# Patient Record
Sex: FEMALE | Race: WHITE | Employment: FULL TIME | URBAN - NONMETROPOLITAN AREA
[De-identification: names, ages, dates, MRNs, and addresses within clinical notes are randomized per-mention and may not be internally consistent; named-entity substitution may affect disease eponyms.]

---

## 2019-07-12 ENCOUNTER — HOSPITAL ENCOUNTER (EMERGENCY)
Facility: HOSPITAL | Age: 49
Discharge: HOME | End: 2019-07-12
Attending: FAMILY MEDICINE | Admitting: FAMILY MEDICINE
Payer: COMMERCIAL

## 2019-07-12 VITALS
SYSTOLIC BLOOD PRESSURE: 131 MMHG | TEMPERATURE: 98.1 F | OXYGEN SATURATION: 93 % | HEART RATE: 89 BPM | DIASTOLIC BLOOD PRESSURE: 83 MMHG | RESPIRATION RATE: 16 BRPM

## 2019-07-12 DIAGNOSIS — M54.42 ACUTE BILATERAL LOW BACK PAIN WITH LEFT-SIDED SCIATICA: ICD-10-CM

## 2019-07-12 PROCEDURE — 96374 THER/PROPH/DIAG INJ IV PUSH: CPT

## 2019-07-12 PROCEDURE — 99284 EMERGENCY DEPT VISIT MOD MDM: CPT | Mod: 25

## 2019-07-12 PROCEDURE — 25000132 ZZH RX MED GY IP 250 OP 250 PS 637: Performed by: FAMILY MEDICINE

## 2019-07-12 PROCEDURE — 25000128 H RX IP 250 OP 636: Performed by: FAMILY MEDICINE

## 2019-07-12 PROCEDURE — 99284 EMERGENCY DEPT VISIT MOD MDM: CPT | Mod: Z6 | Performed by: FAMILY MEDICINE

## 2019-07-12 RX ORDER — FAMOTIDINE 20 MG
TABLET ORAL
COMMUNITY

## 2019-07-12 RX ORDER — CHLORAL HYDRATE 500 MG
2 CAPSULE ORAL DAILY
COMMUNITY

## 2019-07-12 RX ORDER — PREDNISONE 20 MG/1
TABLET ORAL
Qty: 6 TABLET | Refills: 0 | Status: SHIPPED | OUTPATIENT
Start: 2019-07-12

## 2019-07-12 RX ORDER — METHYLPREDNISOLONE SODIUM SUCCINATE 125 MG/2ML
125 INJECTION, POWDER, LYOPHILIZED, FOR SOLUTION INTRAMUSCULAR; INTRAVENOUS ONCE
Status: COMPLETED | OUTPATIENT
Start: 2019-07-12 | End: 2019-07-12

## 2019-07-12 RX ORDER — MULTIPLE VITAMINS W/ MINERALS TAB 9MG-400MCG
1 TAB ORAL DAILY
COMMUNITY

## 2019-07-12 RX ORDER — GABAPENTIN 300 MG/1
900 CAPSULE ORAL ONCE
Status: COMPLETED | OUTPATIENT
Start: 2019-07-12 | End: 2019-07-12

## 2019-07-12 RX ORDER — BUPROPION HYDROCHLORIDE 300 MG/1
300 TABLET ORAL EVERY MORNING
COMMUNITY

## 2019-07-12 RX ORDER — GABAPENTIN 300 MG/1
600 CAPSULE ORAL 3 TIMES DAILY
Qty: 90 CAPSULE | Refills: 0 | Status: SHIPPED | OUTPATIENT
Start: 2019-07-12

## 2019-07-12 RX ADMIN — METHYLPREDNISOLONE SODIUM SUCCINATE 125 MG: 125 INJECTION, POWDER, FOR SOLUTION INTRAMUSCULAR; INTRAVENOUS at 07:02

## 2019-07-12 RX ADMIN — GABAPENTIN 900 MG: 300 CAPSULE ORAL at 07:02

## 2019-07-12 ASSESSMENT — ENCOUNTER SYMPTOMS
BACK PAIN: 1
ACTIVITY CHANGE: 1
NECK PAIN: 0
CONSTIPATION: 0
PSYCHIATRIC NEGATIVE: 1
SHORTNESS OF BREATH: 0
VOMITING: 0
DIARRHEA: 0
DIAPHORESIS: 0
NAUSEA: 0
PALPITATIONS: 0
NUMBNESS: 0
FATIGUE: 1
WEAKNESS: 0

## 2019-07-12 NOTE — ED AVS SNAPSHOT
[facility]  [address] [address]  Phone:  [phone]                                    Sophy Cruz   MRN: [id number]    Department:  [facility]   Date of Visit:  7/12/2019           After Visit Summary Signature Page    I have received my discharge instructions, and my questions have been answered. I have discussed any challenges I see with this plan with the nurse or doctor.    ..........................................................................................................................................  Patient/Patient Representative Signature      ..........................................................................................................................................  Patient Representative Print Name and Relationship to Patient    ..................................................               ................................................  Date                                   Time    ..........................................................................................................................................  Reviewed by Signature/Title    ...................................................              ..............................................  Date                                               Time          22EPIC Rev 08/18

## 2019-07-12 NOTE — ED NOTES
Face to face report given with opportunity to observe patient.    Report given to marcia Dai   7/12/2019  7:19 AM

## 2019-07-12 NOTE — ED PROVIDER NOTES
History     Chief Complaint   Patient presents with     Leg Pain     last night thougt she had flair up of disc pain, has disc problems around L4-L5, woke up this morning with left lwoer back pain that travels down left leg, and minimal pain on right side. took pt one hour to get out of bed     HPI  Sophy Cruz is a 48 year old female who last night had some increase in her L4-5 disc pain, but woke this morning with pain travels down her left leg to the knee.  It radiates around the lateral aspect and into the anterior.  She also has minimal pain on the right side.  She took 1200 mg of ibuprofen at about 0300, states it did not help.  This morning it took her 1 hour to get out of bed, however she was able to shower and ambulate into the emergency room with minimal difficulty.  The pain is 10+/10.  Currently she states that the pain is present, worse with sitting and standing.  She has previously had an epidural injection in her lumbar spine for her back pain.    Allergies:  Allergies not on file    Problem List:    There are no active problems to display for this patient.       Past Medical History:    No past medical history on file.    Past Surgical History:    No past surgical history on file.    Family History:    No family history on file.    Social History:  Marital Status:    Social History     Tobacco Use     Smoking status: Current Every Day Smoker     Packs/day: 1.00     Smokeless tobacco: Never Used   Substance Use Topics     Alcohol use: Yes     Comment: rare     Drug use: Never        Medications:      buPROPion (WELLBUTRIN XL) 300 MG 24 hr tablet   fish oil-omega-3 fatty acids 1000 MG capsule   gabapentin (NEURONTIN) 300 MG capsule   multivitamin w/minerals (MULTI-VITAMIN) tablet   predniSONE (DELTASONE) 20 MG tablet   Vitamin D, Cholecalciferol, 1000 units CAPS         Review of Systems   Constitutional: Positive for activity change and fatigue. Negative for diaphoresis.        Slept poorly  last night   HENT: Negative.    Respiratory: Negative for shortness of breath.    Cardiovascular: Negative for chest pain and palpitations.   Gastrointestinal: Negative for constipation, diarrhea, nausea and vomiting.   Genitourinary: Negative.    Musculoskeletal: Positive for back pain. Negative for neck pain.        See HPI   Neurological: Negative for weakness and numbness.   Psychiatric/Behavioral: Negative.        Physical Exam   BP: 135/98  Pulse: 89  Temp: 97.7  F (36.5  C)  Resp: 18  SpO2: 99 %      Physical Exam   Constitutional: She is oriented to person, place, and time. She appears well-developed and well-nourished. She appears distressed.   Stated, not observed   HENT:   Head: Normocephalic and atraumatic.   Neck: Normal range of motion. Neck supple.   Cardiovascular: Normal rate, regular rhythm and normal heart sounds.   No murmur heard.  Pulmonary/Chest: Effort normal and breath sounds normal. No respiratory distress.   Abdominal: Soft. Bowel sounds are normal. She exhibits no distension. There is no tenderness.   Musculoskeletal: She exhibits tenderness.        Lumbar back: She exhibits decreased range of motion and pain. She exhibits no tenderness, no bony tenderness and no spasm.   Neurological: She is alert and oriented to person, place, and time. She displays normal reflexes. No sensory deficit.   Skin: Skin is warm and dry. Capillary refill takes less than 2 seconds.   Psychiatric: She has a normal mood and affect.   Nursing note and vitals reviewed.      ED Course        Procedures    No results found for this or any previous visit (from the past 24 hour(s)).    Medications   methylPREDNISolone sodium succinate (solu-MEDROL) injection 125 mg (125 mg Intravenous Given 7/12/19 0702)   gabapentin (NEURONTIN) capsule 900 mg (900 mg Oral Given 7/12/19 0702)       Assessments & Plan (with Medical Decision Making)   Patient doing much better, able to walk comfortably into the bathroom without  assistance.  She is feels she is ready to go home, does not want any further intervention at this time.  We will have her follow-up with primary care if symptoms worsen, return to the ED if they are are severe.    I have reviewed the nursing notes.    I have reviewed the findings, diagnosis, plan and need for follow up with the patient.     Medication List      Started    gabapentin 300 MG capsule  Commonly known as:  NEURONTIN  600 mg, Oral, 3 TIMES DAILY     predniSONE 20 MG tablet  Commonly known as:  DELTASONE  Take two tablets (= 40mg) each day for 3 days            Final diagnoses:   Acute bilateral low back pain with left-sided sciatica       7/12/2019   [facility]     Marianna Sexton MD  07/12/19 0948

## 2019-07-12 NOTE — ED NOTES
Reviewed discharge instructions with pt, verbalized understanding, no questions offered. Copy of AVS provided, instructed prescriptions for Neurontin and Prednisone were E-Scribed to pharmacy of choice.

## 2020-01-15 ENCOUNTER — TRANSFERRED RECORDS (OUTPATIENT)
Dept: HEALTH INFORMATION MANAGEMENT | Facility: CLINIC | Age: 50
End: 2020-01-15

## 2020-01-28 ENCOUNTER — HOSPITAL ENCOUNTER (OUTPATIENT)
Dept: MRI IMAGING | Facility: HOSPITAL | Age: 50
Discharge: HOME | End: 2020-01-28
Attending: PHYSICAL MEDICINE & REHABILITATION | Admitting: PHYSICAL MEDICINE & REHABILITATION

## 2020-01-28 DIAGNOSIS — M47.816 SPONDYLOSIS OF LUMBAR REGION WITHOUT MYELOPATHY OR RADICULOPATHY: ICD-10-CM

## 2020-01-28 PROCEDURE — 72148 MRI LUMBAR SPINE W/O DYE: CPT | Mod: TC

## 2020-01-31 ENCOUNTER — DOCUMENTATION ONLY (OUTPATIENT)
Dept: INTERVENTIONAL RADIOLOGY/VASCULAR | Facility: HOSPITAL | Age: 50
End: 2020-01-31

## 2020-01-31 RX ORDER — IBUPROFEN 200 MG
600 TABLET ORAL 3 TIMES DAILY PRN
COMMUNITY

## 2020-01-31 RX ORDER — ACETAMINOPHEN 500 MG
1000 TABLET ORAL AT BEDTIME
COMMUNITY

## 2020-02-10 ENCOUNTER — TELEPHONE (OUTPATIENT)
Dept: INTERVENTIONAL RADIOLOGY/VASCULAR | Facility: HOSPITAL | Age: 50
End: 2020-02-10

## 2020-02-13 ENCOUNTER — HOSPITAL ENCOUNTER (OUTPATIENT)
Dept: INTERVENTIONAL RADIOLOGY/VASCULAR | Facility: HOSPITAL | Age: 50
End: 2020-02-13
Attending: PHYSICAL MEDICINE & REHABILITATION
Payer: COMMERCIAL

## 2020-02-13 ENCOUNTER — HOSPITAL ENCOUNTER (OUTPATIENT)
Facility: HOSPITAL | Age: 50
Discharge: HOME | End: 2020-02-13
Attending: RADIOLOGY | Admitting: PHYSICAL MEDICINE & REHABILITATION
Payer: COMMERCIAL

## 2020-02-13 DIAGNOSIS — M54.16 RADICULOPATHY, LUMBAR REGION: ICD-10-CM

## 2020-02-13 PROCEDURE — C1751 CATH, INF, PER/CENT/MIDLINE: HCPCS | Mod: TC

## 2020-02-13 PROCEDURE — 25000128 H RX IP 250 OP 636: Performed by: RADIOLOGY

## 2020-02-13 RX ORDER — DEXAMETHASONE SODIUM PHOSPHATE 10 MG/ML
INJECTION, SOLUTION INTRAMUSCULAR; INTRAVENOUS
Status: DISCONTINUED
Start: 2020-02-13 | End: 2020-02-14 | Stop reason: HOSPADM

## 2020-02-13 RX ORDER — IOPAMIDOL 612 MG/ML
15 INJECTION, SOLUTION INTRATHECAL ONCE
Status: COMPLETED | OUTPATIENT
Start: 2020-02-13 | End: 2020-02-13

## 2020-02-13 RX ORDER — DEXAMETHASONE SODIUM PHOSPHATE 10 MG/ML
10 INJECTION, SOLUTION INTRAMUSCULAR; INTRAVENOUS ONCE
Status: COMPLETED | OUTPATIENT
Start: 2020-02-13 | End: 2020-02-13

## 2020-02-13 RX ADMIN — DEXAMETHASONE SODIUM PHOSPHATE 10 MG: 10 INJECTION INTRAMUSCULAR; INTRAVENOUS at 13:56

## 2020-02-13 RX ADMIN — IOPAMIDOL 2 ML: 612 INJECTION, SOLUTION INTRATHECAL at 13:56

## 2020-02-13 NOTE — DISCHARGE INSTRUCTIONS
Home number on file [phone] (home)  Is it ok to leave a message at this number(s)? Yes    Dr Fleming completed your procedure on 2/13/2020.    Current Pain Level (0-10 Scale): 3/10  Post Pain Level (0-10):  0/10    Radiology Discharge instructions for Steroid Injection    Activity Level:     Do not do any heavy activity or exercise for 24 hours.   Do not drive for 4 hours after your injection.  Diet:   Return to your normal diet.  Medications:   If you have stopped taking your Aspirin, Coumadin/Warfarin, Ibuprofen, or any   other blood thinner for this procedure you may resume in the morning unless   your primary care provider has given you other instructions.    Diabetics may see an increase in blood sugar after steroid injections. If you are concerned about your blood sugar, please contact your family doctor.    Site Care:  Remove the bandage and bathe or shower the morning after the procedure.      Please allow two weeks to experience improvement in your pain.  If you have any further issues, please contact your provider.    Call your Primary Care Provider if you have the following (if your primary care provider is not available please seek emergency care):   Nausea with vomiting   Severe headache   Drowsiness or confusion   Redness or drainage at the injection or puncture site   Temperature over 101 degrees F   Other concerns   Worsening back pain   Stiff neck

## 2020-02-13 NOTE — IP AVS SNAPSHOT
[facility]  [address] [address]  Phone:  [phone]  Fax:  [phone]                                    After Visit Summary   2/13/2020    Sophy Cruz    MRN: [id number]           After Visit Summary Signature Page    I have received my discharge instructions, and my questions have been answered. I have discussed any challenges I see with this plan with the nurse or doctor.    ..........................................................................................................................................  Patient/Patient Representative Signature      ..........................................................................................................................................  Patient Representative Print Name and Relationship to Patient    ..................................................               ................................................  Date                                   Time    ..........................................................................................................................................  Reviewed by Signature/Title    ...................................................              ..............................................  Date                                               Time          22EPIC Rev 08/18

## 2020-03-06 ENCOUNTER — TELEPHONE (OUTPATIENT)
Dept: INTERVENTIONAL RADIOLOGY/VASCULAR | Facility: HOSPITAL | Age: 50
End: 2020-03-06

## 2020-03-09 ENCOUNTER — TELEPHONE (OUTPATIENT)
Dept: INTERVENTIONAL RADIOLOGY/VASCULAR | Facility: HOSPITAL | Age: 50
End: 2020-03-09

## 2020-03-09 NOTE — TELEPHONE ENCOUNTER
Message left for patient to call us back regarding how she has been doing since her injection on 2/13/20

## 2020-03-13 ENCOUNTER — TELEPHONE (OUTPATIENT)
Dept: INTERVENTIONAL RADIOLOGY/VASCULAR | Facility: HOSPITAL | Age: 50
End: 2020-03-13

## 2020-03-30 ENCOUNTER — TELEPHONE (OUTPATIENT)
Dept: INTERVENTIONAL RADIOLOGY/VASCULAR | Facility: HOSPITAL | Age: 50
End: 2020-03-30

## 2020-03-30 NOTE — TELEPHONE ENCOUNTER
INJECTION POST CALL    Procedure: Epidural Left L4-5 TF  Radiologist(s): Dr. Henry Fleming  Date of Procedure:  2-13-20    Pre-procedure pain score was: 3 (See pre-procedure score)  Post-procedure pain score as of today is: 0 occasionally will flair up to a 2    Percentage of pain improvement today?  100%    Do you feel this injection was beneficial? Yes  If yes, how long did it last? Currently    Where is the pain located?  No pain        The patient had no questions or concerns.    Instruct patient to follow up with their provider for any further care they may need. (as Dr. Fleming will no longer be making recommendations)    Yin Santos

## 2020-09-02 ENCOUNTER — HOSPITAL ENCOUNTER (EMERGENCY)
Facility: HOSPITAL | Age: 50
Discharge: HOME | End: 2020-09-02
Attending: NURSE PRACTITIONER | Admitting: NURSE PRACTITIONER
Payer: COMMERCIAL

## 2020-09-02 VITALS
SYSTOLIC BLOOD PRESSURE: 157 MMHG | RESPIRATION RATE: 16 BRPM | HEART RATE: 69 BPM | TEMPERATURE: 98.9 F | DIASTOLIC BLOOD PRESSURE: 97 MMHG | OXYGEN SATURATION: 97 %

## 2020-09-02 DIAGNOSIS — J02.8 PHARYNGITIS DUE TO OTHER ORGANISM: ICD-10-CM

## 2020-09-02 DIAGNOSIS — J20.9 ACUTE BRONCHITIS: ICD-10-CM

## 2020-09-02 LAB
SPECIMEN SOURCE: NORMAL
STREP GROUP A PCR: NOT DETECTED

## 2020-09-02 PROCEDURE — 87651 STREP A DNA AMP PROBE: CPT | Performed by: NURSE PRACTITIONER

## 2020-09-02 PROCEDURE — 25000132 ZZH RX MED GY IP 250 OP 250 PS 637: Performed by: NURSE PRACTITIONER

## 2020-09-02 PROCEDURE — 99202 OFFICE O/P NEW SF 15 MIN: CPT | Performed by: NURSE PRACTITIONER

## 2020-09-02 PROCEDURE — U0003 INFECTIOUS AGENT DETECTION BY NUCLEIC ACID (DNA OR RNA); SEVERE ACUTE RESPIRATORY SYNDROME CORONAVIRUS 2 (SARS-COV-2) (CORONAVIRUS DISEASE [COVID-19]), AMPLIFIED PROBE TECHNIQUE, MAKING USE OF HIGH THROUGHPUT TECHNOLOGIES AS DESCRIBED BY CMS-2020-01-R: HCPCS | Performed by: NURSE PRACTITIONER

## 2020-09-02 PROCEDURE — C9803 HOPD COVID-19 SPEC COLLECT: HCPCS

## 2020-09-02 PROCEDURE — G0463 HOSPITAL OUTPT CLINIC VISIT: HCPCS

## 2020-09-02 RX ORDER — AZITHROMYCIN 250 MG/1
500 TABLET, FILM COATED ORAL ONCE
Status: COMPLETED | OUTPATIENT
Start: 2020-09-02 | End: 2020-09-02

## 2020-09-02 RX ADMIN — AZITHROMYCIN 500 MG: 250 TABLET, FILM COATED ORAL at 21:16

## 2020-09-02 ASSESSMENT — ENCOUNTER SYMPTOMS
DIARRHEA: 1
DIZZINESS: 1
HEADACHES: 0
SORE THROAT: 1
SINUS PRESSURE: 0
MYALGIAS: 0
ACTIVITY CHANGE: 1
VOMITING: 0
RHINORRHEA: 1
SINUS PAIN: 0
FEVER: 0
LIGHT-HEADEDNESS: 0
NAUSEA: 1
APPETITE CHANGE: 0
COUGH: 1
EYES NEGATIVE: 1
SHORTNESS OF BREATH: 1
CHILLS: 1

## 2020-09-02 NOTE — ED AVS SNAPSHOT
[facility]  [address]  [place] [address]  Phone:  [phone]                                    Sophy Cruz   MRN: [id number]    Department:  [facility]   Date of Visit:  9/2/2020           After Visit Summary Signature Page    I have received my discharge instructions, and my questions have been answered. I have discussed any challenges I see with this plan with the nurse or doctor.    ..........................................................................................................................................  Patient/Patient Representative Signature      ..........................................................................................................................................  Patient Representative Print Name and Relationship to Patient    ..................................................               ................................................  Date                                   Time    ..........................................................................................................................................  Reviewed by Signature/Title    ...................................................              ..............................................  Date                                               Time          22EPIC Rev 08/18

## 2020-09-03 RX ORDER — AZITHROMYCIN 250 MG/1
250 TABLET, FILM COATED ORAL DAILY
Qty: 4 TABLET | Refills: 0 | Status: SHIPPED | OUTPATIENT
Start: 2020-09-03 | End: 2020-09-07

## 2020-09-03 NOTE — DISCHARGE INSTRUCTIONS
Increase oral intake, cool mist vaporizer as needed, rest, avoid sharing utensils, practice good hand washing techniques, cover mouth when you cough and sneeze. Throw toothbursh away 24 hours after starting antibiotics.  Over the counter medications such as ibuprofen and/or acetaminophen for fever and generalized aches and pains. Ibuprofen 400 to 800 mg (2 - 4 tabs of over the counter med) every six to eight hours as needed;not to exceed maximum amount of 3200 mg in 24 hours.Tylenol 650 to 1000 mg every four to six hours as needed (not to exceed more than 4000 mg in a 24 hour period). May use interchangeably. Robitussin (guaifenesin) for cough. Chest rubs such as James's or Mentholatum may help reduce sore throat symptoms.  Chloraseptic spray for sore throat or menthol lozenges may be helpful for sore throat. Be reevaluated if symptoms persist longer than 10 - 14 days or worsen and if there is no improvement in 72 hours or worsening of symptoms.  Increase fluids. Complete all antibiotics even if feeling better. Taking antibiotics with food may decrease the stomach upset that can occur when taking antibiotics. Antibiotics frequently cause diarrhea. Probiotics or yogurt may help prevent or decrease these symptoms.     OTC decongestants (oral or topical).  Decongestants (oral or topical) cause vasoconstriction of the nasal mucosa.  We prefer oral pseudoephedrine to phenylephrine and other oral OTC nasal decongestants. Side effects of oral decongestants may include tachycardia, elevated diastolic blood pressure, and palpitations. Pseudoephedrine 30 to 60 mg every four to six hours as needed for congestion. (Maximum dose is 240 mg in 24 hours). Do not use longer than 72 hours.    Commonly used topical decongestants include oxymetazoline, xylometazoline, and phenylephrine. Side effects of topical decongestants include nosebleeds and drying of the nasal membranes. Topical decongestants should only be used for two to three  days; longer use may result in rebound nasal congestion after discontinuation.    Fluids, herbs, and foods for sore throat relief -- Adjusting the temperature and texture of foods and beverages may provide local relief of sore throat pain. While data showing benefit are quite limited, these approaches are intuitive. We typically advise these measures since they are likely to be safe with minimal adverse effect, and patients often describe relief of symptoms.  We suggest hydration with frozen (eg, ice or popsicles) or heated liquids (eg, teas, soups), rather than room temperature or refrigerated fluids in patient with significant sore throat pain. Very cold foods can have a numbing-like effect that temporarily reduces or alleviates the pain of swallowing. Ice cubes or frozen popsicles facilitate hydration; ice cream and frozen yogurt provide caloric intake.  Warm fluids and foods, including teas, soups, and soft non-irritating foods, may be better tolerated by patients with throat pain than irritating foods (eg, rough-textured or spicy foods) or fluids at room temperatures. Foods that coat the throat, including honey and hard candies, can facilitate intake of calories while temporarily relieving throat pain.

## 2020-09-03 NOTE — ED TRIAGE NOTES
"Patient arrives for evaluation of cough and body aches for the past 5 days. Reports shortness of breath with exertion. Denies fevers. Denies any pain. States \"It feels like when I had bronchitis before.\"  "

## 2020-09-03 NOTE — ED PROVIDER NOTES
History     Chief Complaint   Patient presents with     Cough     HPI  Sophy Cruz is a 50 year old female who presents with a one week history of chills, cough, shortness of breath, nausea, and dizziness. Initially, she had a runny nose and sore throat, but those have resolved. She has chronic diarrhea. She has taken Mucinex, Dayquil, Nyquil, and cough drops with some relief. Her last dose of Mucinex and Dayquil were at 1600 today. Smoker. No known sick contacts. Denies fevers, and vomiting.    Allergies:  No Known Allergies    Problem List:    There are no active problems to display for this patient.       Past Medical History:    History reviewed. No pertinent past medical history.    Past Surgical History:    History reviewed. No pertinent surgical history.    Family History:    No family history on file.    Social History:  Marital Status:   [2]  Social History     Tobacco Use     Smoking status: Current Every Day Smoker     Packs/day: 1.00     Smokeless tobacco: Never Used   Substance Use Topics     Alcohol use: Yes     Comment: rare     Drug use: Never        Medications:    acetaminophen (TYLENOL) 500 MG tablet  buPROPion (WELLBUTRIN XL) 300 MG 24 hr tablet  fish oil-omega-3 fatty acids 1000 MG capsule  gabapentin (NEURONTIN) 300 MG capsule  ibuprofen (ADVIL/MOTRIN) 200 MG tablet  multivitamin w/minerals (MULTI-VITAMIN) tablet  predniSONE (DELTASONE) 20 MG tablet  Vitamin D, Cholecalciferol, 1000 units CAPS          Review of Systems   Constitutional: Positive for activity change and chills. Negative for appetite change and fever.   HENT: Positive for rhinorrhea (resolved) and sore throat (resolved). Negative for ear pain, sinus pressure and sinus pain.    Eyes: Negative.    Respiratory: Positive for cough and shortness of breath.    Gastrointestinal: Positive for diarrhea (chronic) and nausea. Negative for vomiting.   Musculoskeletal: Negative for myalgias.   Skin: Negative.    Neurological:  Positive for dizziness. Negative for light-headedness and headaches.       Physical Exam   BP: 157/97  Pulse: 69  Temp: 98.9  F (37.2  C)  Resp: 16  SpO2: 97 %      Physical Exam  Vitals signs and nursing note reviewed.   Constitutional:       General: She is in acute distress.      Appearance: She is overweight.   HENT:      Head: Normocephalic.      Right Ear: Ear canal normal. A middle ear effusion (clear fluid) is present. Tympanic membrane is retracted.      Left Ear: Ear canal normal. A middle ear effusion (clear fluid) is present. Tympanic membrane is retracted.      Nose: Rhinorrhea present.      Right Sinus: No maxillary sinus tenderness or frontal sinus tenderness.      Left Sinus: No maxillary sinus tenderness or frontal sinus tenderness.      Mouth/Throat:      Lips: Pink.      Mouth: Mucous membranes are moist.      Pharynx: Uvula midline. Posterior oropharyngeal erythema (rash) present.   Eyes:      Conjunctiva/sclera: Conjunctivae normal.   Cardiovascular:      Rate and Rhythm: Normal rate and regular rhythm.      Heart sounds: Normal heart sounds. No murmur.   Pulmonary:      Effort: Pulmonary effort is normal. No respiratory distress.      Breath sounds: Normal breath sounds. No wheezing.   Lymphadenopathy:      Cervical: Cervical adenopathy present.      Right cervical: Superficial cervical adenopathy present.      Left cervical: Superficial cervical adenopathy present.   Skin:     General: Skin is warm and dry.   Neurological:      Mental Status: She is alert and oriented to person, place, and time.   Psychiatric:         Behavior: Behavior normal.         ED Course        Procedures           No results found for this or any previous visit (from the past 24 hour(s)).    Medications   azithromycin (ZITHROMAX) tablet 500 mg (has no administration in time range)       Assessments & Plan (with Medical Decision Making)     I have reviewed the nursing notes.    I have reviewed the findings, diagnosis,  plan and need for follow up with the patient.  (J02.8) Pharyngitis due to other organism    (J20.9) Acute bronchitis  Comment: 50 year old female who presents with a one week history of chills, cough, shortness of breath, nausea, and dizziness. Initially, she had a runny nose and sore throat, but those have resolved. She has chronic diarrhea. She has taken Mucinex, Dayquil, Nyquil, and cough drops with some relief. Her last dose of Mucinex and Dayquil were at 1600 today. Smoker. No known sick contacts. Denies fevers, and vomiting.    Assessment: NHT. Lungs CTA. Bilateral, anterior cervical adenopathy. Posterior oropharynx erythematous with rash.      Strep negative  COVID pending    Plan: Azithromycin started in UC. Education provided for bronchitis and pharyngitis.  Treat symptoms conservatively with acetaminophen and  ibuprofen (if applicable) for fevers, body aches, and headaches, guaifenesin and/or honey for cough. May use chest rubs for sore throat and congestion, hot and cold liquids may help decrease sore throat and help you feel better. Increase fluids. You may utilize pseudoephedrine for congestion. Return to be reevaluated by ER/UC or your primary care provider if symptoms worsen, you develop breathing difficulties, or you do not improve in a reasonable time frame. It can take several days for a cough to resolve. It can take ten to fourteen days for upper respiratory symptoms to resolve.   COVID-19 Get[facility] Loop Referral  These discharge instructions and medications were reviewed with her and understanding verbalized.    9/3/20 notified of negative strep test this morning.    New Prescriptions    No medications on file       Final diagnoses:   Pharyngitis due to other organism   Acute bronchitis       9/2/2020   [facility]       Naomy Villasenor, CNP  09/03/20 1545

## 2020-09-03 NOTE — ED TRIAGE NOTES
"Pt presents today with c/o possible bronchitis per pt \"janis had it before and it feels the same\". C/o cough, coarse voice, back pain, body aches, all started 1 week ago and getting worse. Pt has tried night quil, mucinex, cough drops.   "

## 2020-09-03 NOTE — ED NOTES
Patient was provided written and verbal discharge instructions by MARCELA Moralez. Patient verbalizes no further questions and left department ambulatory.

## 2020-09-04 LAB
SARS-COV-2 RNA SPEC QL NAA+PROBE: NOT DETECTED
SPECIMEN SOURCE: NORMAL

## 2022-05-15 NOTE — TELEPHONE ENCOUNTER
Left message to inform patient not to have a flu shot/immunization two weeks before or after appointment.   no back pain/no calf pain